# Patient Record
Sex: FEMALE | Race: WHITE | NOT HISPANIC OR LATINO | URBAN - METROPOLITAN AREA
[De-identification: names, ages, dates, MRNs, and addresses within clinical notes are randomized per-mention and may not be internally consistent; named-entity substitution may affect disease eponyms.]

---

## 2018-05-11 PROBLEM — Z00.00 ENCOUNTER FOR PREVENTIVE HEALTH EXAMINATION: Status: ACTIVE | Noted: 2018-05-11

## 2018-05-14 ENCOUNTER — OUTPATIENT (OUTPATIENT)
Dept: OUTPATIENT SERVICES | Facility: HOSPITAL | Age: 48
LOS: 1 days | End: 2018-05-14
Payer: COMMERCIAL

## 2018-05-14 ENCOUNTER — APPOINTMENT (OUTPATIENT)
Dept: SURGERY | Facility: CLINIC | Age: 48
End: 2018-05-14

## 2018-05-14 DIAGNOSIS — Z01.818 ENCOUNTER FOR OTHER PREPROCEDURAL EXAMINATION: ICD-10-CM

## 2018-05-14 DIAGNOSIS — M95.0 ACQUIRED DEFORMITY OF NOSE: ICD-10-CM

## 2018-05-14 LAB
ALBUMIN SERPL ELPH-MCNC: 4.8 G/DL — SIGNIFICANT CHANGE UP (ref 3.3–5)
ALP SERPL-CCNC: 70 U/L — SIGNIFICANT CHANGE UP (ref 40–120)
ALT FLD-CCNC: 15 U/L — SIGNIFICANT CHANGE UP (ref 10–45)
ANION GAP SERPL CALC-SCNC: 14 MMOL/L — SIGNIFICANT CHANGE UP (ref 5–17)
APTT BLD: 39.3 SEC — HIGH (ref 27.5–37.4)
AST SERPL-CCNC: 23 U/L — SIGNIFICANT CHANGE UP (ref 10–40)
BILIRUB SERPL-MCNC: 0.5 MG/DL — SIGNIFICANT CHANGE UP (ref 0.2–1.2)
BUN SERPL-MCNC: 14 MG/DL — SIGNIFICANT CHANGE UP (ref 7–23)
CALCIUM SERPL-MCNC: 10 MG/DL — SIGNIFICANT CHANGE UP (ref 8.4–10.5)
CHLORIDE SERPL-SCNC: 102 MMOL/L — SIGNIFICANT CHANGE UP (ref 96–108)
CO2 SERPL-SCNC: 24 MMOL/L — SIGNIFICANT CHANGE UP (ref 22–31)
CREAT SERPL-MCNC: 0.59 MG/DL — SIGNIFICANT CHANGE UP (ref 0.5–1.3)
GLUCOSE SERPL-MCNC: 94 MG/DL — SIGNIFICANT CHANGE UP (ref 70–99)
HCG SERPL-ACNC: <.1 MIU/ML — SIGNIFICANT CHANGE UP
HCT VFR BLD CALC: 44.6 % — SIGNIFICANT CHANGE UP (ref 34.5–45)
HGB BLD-MCNC: 14.8 G/DL — SIGNIFICANT CHANGE UP (ref 11.5–15.5)
INR BLD: 0.92 — SIGNIFICANT CHANGE UP (ref 0.88–1.16)
MCHC RBC-ENTMCNC: 29 PG — SIGNIFICANT CHANGE UP (ref 27–34)
MCHC RBC-ENTMCNC: 33.2 G/DL — SIGNIFICANT CHANGE UP (ref 32–36)
MCV RBC AUTO: 87.5 FL — SIGNIFICANT CHANGE UP (ref 80–100)
PLATELET # BLD AUTO: 297 K/UL — SIGNIFICANT CHANGE UP (ref 150–400)
POTASSIUM SERPL-MCNC: 5.2 MMOL/L — SIGNIFICANT CHANGE UP (ref 3.5–5.3)
POTASSIUM SERPL-SCNC: 5.2 MMOL/L — SIGNIFICANT CHANGE UP (ref 3.5–5.3)
PROT SERPL-MCNC: 7.8 G/DL — SIGNIFICANT CHANGE UP (ref 6–8.3)
PROTHROM AB SERPL-ACNC: 10.2 SEC — SIGNIFICANT CHANGE UP (ref 9.8–12.7)
RBC # BLD: 5.1 M/UL — SIGNIFICANT CHANGE UP (ref 3.8–5.2)
RBC # FLD: 14.1 % — SIGNIFICANT CHANGE UP (ref 10.3–16.9)
SODIUM SERPL-SCNC: 140 MMOL/L — SIGNIFICANT CHANGE UP (ref 135–145)
WBC # BLD: 9 K/UL — SIGNIFICANT CHANGE UP (ref 3.8–10.5)
WBC # FLD AUTO: 9 K/UL — SIGNIFICANT CHANGE UP (ref 3.8–10.5)

## 2018-05-14 PROCEDURE — 93010 ELECTROCARDIOGRAM REPORT: CPT

## 2018-05-14 PROCEDURE — 71046 X-RAY EXAM CHEST 2 VIEWS: CPT | Mod: 26

## 2018-05-16 ENCOUNTER — RESULT REVIEW (OUTPATIENT)
Age: 48
End: 2018-05-16

## 2018-05-16 ENCOUNTER — OUTPATIENT (OUTPATIENT)
Dept: OUTPATIENT SERVICES | Facility: HOSPITAL | Age: 48
LOS: 1 days | Discharge: HOME | End: 2018-05-16

## 2018-05-21 LAB — SURGICAL PATHOLOGY STUDY: SIGNIFICANT CHANGE UP

## 2019-11-07 ENCOUNTER — EMERGENCY (EMERGENCY)
Facility: HOSPITAL | Age: 49
LOS: 1 days | Discharge: HOME | End: 2019-11-07
Attending: EMERGENCY MEDICINE | Admitting: EMERGENCY MEDICINE
Payer: COMMERCIAL

## 2019-11-07 VITALS
OXYGEN SATURATION: 97 % | DIASTOLIC BLOOD PRESSURE: 78 MMHG | SYSTOLIC BLOOD PRESSURE: 136 MMHG | HEART RATE: 81 BPM | RESPIRATION RATE: 18 BRPM | TEMPERATURE: 100 F

## 2019-11-07 PROCEDURE — 72125 CT NECK SPINE W/O DYE: CPT | Mod: 26

## 2019-11-07 PROCEDURE — 70450 CT HEAD/BRAIN W/O DYE: CPT | Mod: 26

## 2019-11-07 PROCEDURE — 72125 CT NECK SPINE W/O DYE: CPT

## 2019-11-07 PROCEDURE — 96372 THER/PROPH/DIAG INJ SC/IM: CPT | Mod: XU

## 2019-11-07 PROCEDURE — 90715 TDAP VACCINE 7 YRS/> IM: CPT

## 2019-11-07 PROCEDURE — 12002 RPR S/N/AX/GEN/TRNK2.6-7.5CM: CPT

## 2019-11-07 PROCEDURE — 70450 CT HEAD/BRAIN W/O DYE: CPT

## 2019-11-07 PROCEDURE — 99285 EMERGENCY DEPT VISIT HI MDM: CPT | Mod: 25

## 2019-11-07 PROCEDURE — 99284 EMERGENCY DEPT VISIT MOD MDM: CPT | Mod: 25

## 2019-11-07 PROCEDURE — 90471 IMMUNIZATION ADMIN: CPT

## 2019-11-07 RX ORDER — TETANUS TOXOID, REDUCED DIPHTHERIA TOXOID AND ACELLULAR PERTUSSIS VACCINE, ADSORBED 5; 2.5; 8; 8; 2.5 [IU]/.5ML; [IU]/.5ML; UG/.5ML; UG/.5ML; UG/.5ML
0.5 SUSPENSION INTRAMUSCULAR ONCE
Refills: 0 | Status: COMPLETED | OUTPATIENT
Start: 2019-11-07 | End: 2019-11-07

## 2019-11-07 RX ORDER — ONDANSETRON 8 MG/1
4 TABLET, FILM COATED ORAL ONCE
Refills: 0 | Status: COMPLETED | OUTPATIENT
Start: 2019-11-07 | End: 2019-11-07

## 2019-11-07 RX ADMIN — TETANUS TOXOID, REDUCED DIPHTHERIA TOXOID AND ACELLULAR PERTUSSIS VACCINE, ADSORBED 0.5 MILLILITER(S): 5; 2.5; 8; 8; 2.5 SUSPENSION INTRAMUSCULAR at 03:47

## 2019-11-07 RX ADMIN — ONDANSETRON 4 MILLIGRAM(S): 8 TABLET, FILM COATED ORAL at 00:50

## 2019-11-07 NOTE — ED ADULT NURSE NOTE - OBJECTIVE STATEMENT
[age] pt was out with friends drinking and they brought her home and she fell, striking her head on a column.  small abrasion to the back of her head.  arrives awake and confused with slurred speech .  follows commands.

## 2019-11-07 NOTE — ED PROVIDER NOTE - PATIENT PORTAL LINK FT
You can access the FollowMyHealth Patient Portal offered by [facility] by registering at the following website: [email or web address]. By joining [employer]’s FollowMyHealth portal, you will also be able to view your health information using other applications (apps) compatible with our system.

## 2019-11-07 NOTE — ED ADULT NURSE NOTE - NSIMPLEMENTINTERV_GEN_ALL_ED
Implemented All Fall Risk Interventions:  [place] to call system. Call bell, personal items and telephone within reach. Instruct patient to call for assistance. Room bathroom lighting operational. Non-slip footwear when patient is off stretcher. Physically safe environment: no spills, clutter or unnecessary equipment. Stretcher in lowest position, wheels locked, appropriate side rails in place. Provide visual cue, wrist band, yellow gown, etc. Monitor gait and stability. Monitor for mental status changes and reorient to person, place, and time. Review medications for side effects contributing to fall risk. Reinforce activity limits and safety measures with patient and family.

## 2019-11-07 NOTE — ED PROVIDER NOTE - CLINICAL SUMMARY MEDICAL DECISION MAKING FREE TEXT BOX
Intoxication and fall with head injury.  Protecting airway.  HD stable.  CT head and c-spine negative.  Tetanus booster given.  Vomited once, treated with zofran.  Improved during observation period.

## 2019-11-07 NOTE — ED PROVIDER NOTE - PROGRESS NOTE DETAILS
Vomited once while in ED radiology suite, NBNB emesis.  Zofran ordered.  No signs of respiratory distress.  Lungs clear. Do not suspect aspiration. Awake, alert and oriented x 3.  Speech clear.  Gait steady.  Has no complaints.  No tremor or tongue fasciculation.  No hx of alcohol withdrawal symptoms.  Requesting discharge home.

## 2019-11-07 NOTE — ED PROVIDER NOTE - NSFOLLOWUPINSTRUCTIONS_ED_ALL_ED_FT
Stay well-hydrated.  Avoid alcohol.  Apply bacitracin to staples twice daily for the next 3-4 days.  Staples should be removed in 7-10 days.  Return to the Emergency Department if you have any new or worsening symptoms, or if you have any concerns.  _____________________________________________________________  HEAD INJURY - AfterCare(R) Instructions(ER/ED)     Head Injury    WHAT YOU NEED TO KNOW:    A head injury can include your scalp, face, skull, or brain and range from mild to severe. Effects can appear immediately after the injury or develop later. The effects may last a short time or be permanent. Healthcare providers may want to check your recovery over time. Treatment may change as you recover or develop new health problems from the head injury.    DISCHARGE INSTRUCTIONS:    Call your local emergency number (911 in the ), or have someone else call if:     You cannot be woken.      You have a seizure.      You stop responding to others or you faint.      You have blurry or double vision.      Your speech becomes slurred or confused.      You have arm or leg weakness, loss of feeling, or new problems with coordination.      Your pupils are larger than usual, or one pupil is a different size than the other.      You have blood or clear fluid coming out of your ears or nose.    Return to the emergency department if:     You have repeated or forceful vomiting.      You feel confused.      Your headache gets worse or becomes severe.      You or someone caring for you notices that you are harder to wake than usual.    Call your doctor if:     Your symptoms last longer than 6 weeks after the injury.      You have questions or concerns about your condition or care.    Medicines:     Acetaminophen decreases pain and fever. It is available without a doctor's order. Ask how much to take and how often to take it. Follow directions. Read the labels of all other medicines you are using to see if they also contain acetaminophen, or ask your doctor or pharmacist. Acetaminophen can cause liver damage if not taken correctly. Do not use more than 4 grams (4,000 milligrams) total of acetaminophen in one day.       Take your medicine as directed. Contact your healthcare provider if you think your medicine is not helping or if you have side effects. Tell him or her if you are allergic to any medicine. Keep a list of the medicines, vitamins, and herbs you take. Include the amounts, and when and why you take them. Bring the list or the pill bottles to follow-up visits. Carry your medicine list with you in case of an emergency.    Self-care:     Rest or do quiet activities. Limit your time watching TV, using the computer, or doing tasks that require a lot of thinking. Slowly return to your normal activities as directed. Do not play sports or do activities that may cause you to get hit in the head. Ask your healthcare provider when you can return to sports.      Apply ice on your head for 15 to 20 minutes every hour or as directed. Use an ice pack, or put crushed ice in a plastic bag. Cover it with a towel before you apply it to your skin. Ice helps prevent tissue damage and decreases swelling and pain.      Have someone stay with you for 24 hours , or as directed. This person can monitor you for problems and call for help if needed. When you are awake, the person should ask you a few questions every few hours to see if you are thinking clearly. An example is to ask your name or address.    Prevent another head injury:     Wear a helmet that fits properly. Do this when you play sports, or ride a bike, scooter, or skateboard. Helmets help decrease your risk for a serious head injury. Talk to your healthcare provider about other ways you can protect yourself if you play sports.      Wear your seatbelt every time you are in a car. This helps lower your risk for a head injury if you are in a car accident.    Follow up with your doctor as directed: Write down your questions so you remember to ask them during your visits.  ____________________________________________________________________________    STAPLE CARE - AfterCare(R) Instructions(ER/ED)     Staple Care    WHAT YOU NEED TO KNOW:    Staples are often used to close a wound. Your staples may be placed for 3 to 14 days, depending on the location of your wound.    DISCHARGE INSTRUCTIONS:    Care for your wound:     Clean:   You may be able to shower in 24 hours. Do not soak your wound under water.      Gently wash your wound with soap and warm water daily. Lightly pat it dry. Do not cover your wound unless your healthcare provider tells you to.       You may also need to clean your wound with a mixture of hydrogen peroxide and water. Ask how to do this.      Do not apply ointment or cream to the wound unless your healthcare provider tells you to.      Elevate:   Rest any arm or leg that has a wound on pillows above the level of your heart. Do this as often as possible for 2 days. This will help decrease swelling and pain, and help you heal faster.          Minimize scarring:   Avoid sunshine on your wound to reduce scarring.         Follow up with your healthcare provider as directed: You may need to return for a wound checkup 3 days after your staples are placed. Ask when you should return to get your staples removed.    Staple removal:     A medical staple remover will be used to take out your staples. Your healthcare provider will slide the tool under each staple, squeeze the handle, and gently pull the staple out.       Medical tape will be placed on your wound once your staples are removed. This will help keep your wound closed. The medical tape will fall off on its own after several days.     Contact your healthcare provider if:     You have redness, pain, swelling, and pus draining from your wound.      Your pain medicine does not relieve your pain.      You have a fever of 101°F (38.5°C) or higher.      You have an odor coming from your wound.      You have questions or concerns about your condition or care.    Return to the emergency department if:     Your wound reopens.      You have red streaks in your skin that spread out from your wound.      You have severe pain or vomiting.  _______________________________________________________________________________    ALCOHOL INTOXICATION - AfterCare(R) Instructions(ER/ED)     Alcohol Intoxication    WHAT YOU NEED TO KNOW:    Alcohol intoxication is a harmful physical condition caused when you drink more alcohol than your body can handle. It is also called ethanol poisoning, or being drunk.    DISCHARGE INSTRUCTIONS:    Call your local emergency number (911 in the [place]) if:     You have sudden trouble breathing or chest pain.      You have a seizure.      You feel sad enough to harm yourself or others.    Call your doctor if:     You have hallucinations (you see or hear things that are not real).      You cannot stop vomiting.      You have questions or concerns about your condition or care.    Recommended alcohol limits:     Men 21 to 64 years should limit alcohol to 2 drinks a day. Do not have more than 4 drinks in 1 day or more than 14 in 1 week.      All women, and men 65 or older should limit alcohol to 1 drink in a day. Do not have more than 3 drinks in 1 day or more than 7 in 1 week. No amount of alcohol is okay during pregnancy.    Manage alcohol use:     Decrease the amount you drink. This can help prevent health problems such as brain, heart, and liver damage, high blood pressure, diabetes, and cancer. If you cannot stop completely, healthcare providers can help you set goals to decrease the amount you drink.      Plan weekly alcohol use. You will be less likely to drink more than the recommended limit if you plan ahead.      Have food when you drink alcohol. Food will prevent alcohol from getting into your system too quickly. Eat before you have your first alcohol drink.      Time your drinks carefully. Have no more than 1 drink in an hour. Have a liquid such as water, coffee, or a soft drink between alcohol drinks.      Do not drive if you have had alcohol. Make sure someone who has not been drinking can help you get home.      Do not drink alcohol if you are taking medicine. Alcohol is dangerous when you combine it with certain medicines, such as acetaminophen or blood pressure medicine. Talk to your healthcare provider about all the medicines you currently take.    For more information:     Alcoholics Anonymous  Web Address: http://www.aa.org      Substance Abuse and Mental Health Services Administration  [address]  [place] [address]  Web Address: http://www.samhsa.gov      Follow up with your healthcare provider as directed: Write down your questions so you remember to ask them during your visits.

## 2019-11-07 NOTE — ED PROVIDER NOTE - OBJECTIVE STATEMENT
50 yo fem BIBA; had been drinking cocktails (gin) at a work party and friends took her home in a taxi; when they got to her apartment friend (Pearl) states patient lost balance and fell in her kitchen, striking back of head against the corner of a pillar/column, sustaining laceration to back of head.  No LOC.  Patient lethargic but responds to voice, slurred speech.  Unknown last tetanus.

## 2019-11-07 NOTE — ED ADULT TRIAGE NOTE - CHIEF COMPLAINT QUOTE
fall with head strike and laceration to scalp s/p drinking alcohol; no LOC as per friend with patient; no blood thinners; PERRL;  as per EMS

## 2019-11-07 NOTE — ED ADULT NURSE REASSESSMENT NOTE - NS ED NURSE REASSESS COMMENT FT1
pt awake and ambulatory with an unsteady gait.  helped to BR and then returned to stretcher. will sleep longer and call friend in a few hours to pick pt up.
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
## 2019-11-12 DIAGNOSIS — F10.120 ALCOHOL ABUSE WITH INTOXICATION, UNCOMPLICATED: ICD-10-CM
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
## 2019-11-12 DIAGNOSIS — Z23 ENCOUNTER FOR IMMUNIZATION: ICD-10-CM
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
## 2019-11-12 DIAGNOSIS — S01.01XA LACERATION WITHOUT FOREIGN BODY OF SCALP, INITIAL ENCOUNTER: ICD-10-CM

## 2019-11-20 ENCOUNTER — TRANSCRIPTION ENCOUNTER (OUTPATIENT)
Age: 49
End: 2019-11-20

## 2019-11-23 ENCOUNTER — EMERGENCY (EMERGENCY)
Facility: HOSPITAL | Age: 49
LOS: 1 days | Discharge: HOME | End: 2019-11-23
Admitting: EMERGENCY MEDICINE
Payer: COMMERCIAL

## 2019-11-23 VITALS
OXYGEN SATURATION: 96 % | WEIGHT: 175.93 LBS | SYSTOLIC BLOOD PRESSURE: 136 MMHG | DIASTOLIC BLOOD PRESSURE: 80 MMHG | TEMPERATURE: 98 F | HEART RATE: 85 BPM | RESPIRATION RATE: 16 BRPM | HEIGHT: 66 IN

## 2019-11-23 PROCEDURE — 99212 OFFICE O/P EST SF 10 MIN: CPT

## 2019-11-23 NOTE — ED ADULT NURSE NOTE - OBJECTIVE STATEMENT
s/p fall from 2 weeks ago, Patient returns to ED for staple removal ( 8 staples ) on back of head, denies any pain or fevers, no discharge noted from site.

## 2019-11-23 NOTE — ED ADULT NURSE NOTE - NSIMPLEMENTINTERV_GEN_ALL_ED
Implemented All Fall Risk Interventions:  [place] to call system. Call bell, personal items and telephone within reach. Instruct patient to call for assistance. Room bathroom lighting operational. Non-slip footwear when patient is off stretcher. Physically safe environment: no spills, clutter or unnecessary equipment. Stretcher in lowest position, wheels locked, appropriate side rails in place. Provide visual cue, wrist band, yellow gown, etc. Monitor gait and stability. Monitor for mental status changes and reorient to person, place, and time. Review medications for side effects contributing to fall risk. Reinforce activity limits and safety measures with patient and family.

## 2019-11-23 NOTE — ED PROVIDER NOTE - PHYSICAL EXAMINATION
CONSTITUTIONAL: Well-appearing; well-nourished; in no apparent distress.   HEAD: Normocephalic; staples in place   EYES: PERRL; EOM intact; conjunctiva and sclera clear  ENT: normal nose; no rhinorrhea; normal pharynx with no erythema or lesions.   NECK: Supple; non-tender;   CARDIOVASCULAR: Normal S1, S2; no murmurs, rubs, or gallops. Regular rate and rhythm.   RESPIRATORY: Breathing easily; breath sounds clear and equal bilaterally; no wheezes, rhonchi, or rales.  MSK: FROM at all extremities, normal tone   EXT: No cyanosis or edema; N/V intact  SKIN: Normal for age and race; warm; dry; good turgor; no apparent lesions or rash.

## 2019-11-23 NOTE — ED PROVIDER NOTE - OBJECTIVE STATEMENT
50 yo F with no pmh here for staple removal. 8 staples placed 16 days ago to occipital scalp. Denies ha, dizziness, n/v, fever, chills.

## 2019-11-23 NOTE — ED ADULT NURSE NOTE - CHPI ED NUR SYMPTOMS NEG
no pain/no bleeding at site/no chills/no fever/no inflammation/no drainage/no purulent drainage/no redness/no bleeding

## 2019-11-23 NOTE — ED PROVIDER NOTE - CLINICAL SUMMARY MEDICAL DECISION MAKING FREE TEXT BOX
48 yo F with no pmh here for staple removal. 8 staples placed 16 days ago to occipital scalp. Denies ha, dizziness, n/v, fever, chills. Staples removed without complication.

## 2019-11-23 NOTE — ED PROVIDER NOTE - PATIENT PORTAL LINK FT
You can access the FollowMyHealth Patient Portal offered by [facility] by registering at the following website: [email or web address]. By joining [employer]’s FollowMyHealth portal, you will also be able to view your health information using other applications (apps) compatible with our system.

## 2019-11-24 PROBLEM — Z78.9 OTHER SPECIFIED HEALTH STATUS: Chronic | Status: ACTIVE | Noted: 2019-11-07

## 2019-11-27 DIAGNOSIS — Z48.02 ENCOUNTER FOR REMOVAL OF SUTURES: ICD-10-CM

## 2019-11-27 DIAGNOSIS — S01.01XD LACERATION WITHOUT FOREIGN BODY OF SCALP, SUBSEQUENT ENCOUNTER: ICD-10-CM
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
## 2021-04-26 NOTE — ED ADULT TRIAGE NOTE - BP NONINVASIVE SYSTOLIC (MM HG)
-- DO NOT REPLY / DO NOT REPLY ALL --  -- Message is from the [facility]--    Order Request  Lab: Non Fasting Labs    Message / reason: Following instructions from last appointment to schedule labs.    Insurance type: MEDICARE  Payor: [employer] / Plan: [employer] [facility] DUAL MEDICARE / Product Type: MEDICARE    Preferred Delivery Method   Call when ready for pickup - phone number to notify: [phone]     Caller Information       Type Contact Phone    04/24/2021 09:56 AM CDT Phone (Incoming) BERNARD CUNNINGHAM (Emergency Contact) [phone]          Alternative phone number: none    Turnaround time given to caller:   \"This message will be sent to [state Provider's name]. The clinical team will fulfill your request as soon as they review your message when the office opens on Monday (or after the holiday).\"  
Please advise no labs in the system.  
Please call to schedule a lab appt  
Spoke to patient's [facility], scheduled a lab appointment 05/15/21 at 11:45 am at [place].  
There are already orders in the system for BMP and thyroid...  
136

## 2021-12-10 NOTE — ED PROVIDER NOTE - PHYSICAL EXAMINATION
Pt A&Ox3, appears drowsy but easily arousable, responds to questions appropriately. HR elevated at 120/min, respirations shallow and increased at 30/min. O2 sats WNL. SCDs and bunny boots to BLE w/ dressings to bilat. heels. Generalized 2+ edema noted.  Zachary CONSTITUTIONAL: lethargic, + alcohol on breath  SKIN: Normal color and turgor. No rash.    HEAD: 5 cm linear laceration to posterior scalp, no depressed fracture.  No periorbital ecchymosis or Moralez sign.  EYES: Conjunctiva mildly injected. EOMI. PERRL.    ENT: Airway patent, OP without erythema, tonsillar swelling or exudate; uvula midline without swelling. Nasal mucosa clear, no rhinorrhea.   RESPIRATORY:  Breathing non-labored. No retractions or accessory muscle use.  Lungs CTA bilat.  CARDIOVASCULAR:  RRR, S1S2. No M/R/G.      GI:  Abdomen soft, nontender.    MSK: Neck supple.  No extremity edema or calf tenderness.  No joint swelling or ROM limitation.  NEURO: Responsive to voice.  Slurred speech.  QUIÑONES.

## 2023-04-10 NOTE — ED ADULT NURSE NOTE - NSFALLRSKUNASSIST_ED_ALL_ED
no